# Patient Record
Sex: MALE | Race: WHITE | NOT HISPANIC OR LATINO
[De-identification: names, ages, dates, MRNs, and addresses within clinical notes are randomized per-mention and may not be internally consistent; named-entity substitution may affect disease eponyms.]

---

## 2017-06-27 ENCOUNTER — RX RENEWAL (OUTPATIENT)
Age: 52
End: 2017-06-27

## 2017-10-20 ENCOUNTER — APPOINTMENT (OUTPATIENT)
Dept: OTHER | Facility: CLINIC | Age: 52
End: 2017-10-20
Payer: COMMERCIAL

## 2017-11-02 ENCOUNTER — RESULT CHARGE (OUTPATIENT)
Age: 52
End: 2017-11-02

## 2017-11-03 ENCOUNTER — APPOINTMENT (OUTPATIENT)
Dept: OTHER | Facility: CLINIC | Age: 52
End: 2017-11-03
Payer: COMMERCIAL

## 2017-11-03 ENCOUNTER — LABORATORY RESULT (OUTPATIENT)
Age: 52
End: 2017-11-03

## 2017-11-03 VITALS
OXYGEN SATURATION: 99 % | HEIGHT: 72 IN | WEIGHT: 250 LBS | HEART RATE: 81 BPM | SYSTOLIC BLOOD PRESSURE: 121 MMHG | DIASTOLIC BLOOD PRESSURE: 84 MMHG | BODY MASS INDEX: 33.86 KG/M2

## 2017-11-03 DIAGNOSIS — J37.0 CHRONIC LARYNGITIS: ICD-10-CM

## 2017-11-03 PROCEDURE — 99214 OFFICE O/P EST MOD 30 MIN: CPT | Mod: 25

## 2017-11-03 PROCEDURE — 94010 BREATHING CAPACITY TEST: CPT

## 2017-11-03 PROCEDURE — 96150: CPT

## 2017-11-03 PROCEDURE — 99396 PREV VISIT EST AGE 40-64: CPT

## 2017-11-03 RX ORDER — PREDNISONE 5 MG/1
5 TABLET ORAL
Qty: 100 | Refills: 0 | Status: DISCONTINUED | COMMUNITY
Start: 2017-05-18

## 2017-11-03 RX ORDER — METOPROLOL SUCCINATE 25 MG/1
25 TABLET, EXTENDED RELEASE ORAL
Qty: 30 | Refills: 0 | Status: DISCONTINUED | COMMUNITY
Start: 2017-06-27

## 2017-11-06 LAB
ALBUMIN SERPL ELPH-MCNC: 4.5 G/DL
ALP BLD-CCNC: 50 U/L
ALT SERPL-CCNC: 19 U/L
ANION GAP SERPL CALC-SCNC: 18 MMOL/L
APPEARANCE: CLEAR
AST SERPL-CCNC: 21 U/L
BASOPHILS # BLD AUTO: 0.08 K/UL
BASOPHILS NFR BLD AUTO: 0.7 %
BILIRUB SERPL-MCNC: 0.4 MG/DL
BILIRUBIN URINE: NEGATIVE
BLOOD URINE: NEGATIVE
BUN SERPL-MCNC: 13 MG/DL
CALCIUM SERPL-MCNC: 9.3 MG/DL
CHLORIDE SERPL-SCNC: 104 MMOL/L
CHOLEST SERPL-MCNC: 202 MG/DL
CHOLEST/HDLC SERPL: 4.1 RATIO
CO2 SERPL-SCNC: 20 MMOL/L
COLOR: YELLOW
CREAT SERPL-MCNC: 1.09 MG/DL
EOSINOPHIL # BLD AUTO: 0.31 K/UL
EOSINOPHIL NFR BLD AUTO: 2.6 %
GLUCOSE QUALITATIVE U: NEGATIVE MG/DL
GLUCOSE SERPL-MCNC: 70 MG/DL
HCT VFR BLD CALC: 47.4 %
HDLC SERPL-MCNC: 49 MG/DL
HGB BLD-MCNC: 15 G/DL
IMM GRANULOCYTES NFR BLD AUTO: 1.5 %
KETONES URINE: NEGATIVE
LDLC SERPL CALC-MCNC: 125 MG/DL
LEUKOCYTE ESTERASE URINE: NEGATIVE
LYMPHOCYTES # BLD AUTO: 2.4 K/UL
LYMPHOCYTES NFR BLD AUTO: 20.1 %
MAN DIFF?: NORMAL
MCHC RBC-ENTMCNC: 30.2 PG
MCHC RBC-ENTMCNC: 31.6 GM/DL
MCV RBC AUTO: 95.6 FL
MONOCYTES # BLD AUTO: 1.16 K/UL
MONOCYTES NFR BLD AUTO: 9.7 %
NEUTROPHILS # BLD AUTO: 7.84 K/UL
NEUTROPHILS NFR BLD AUTO: 65.4 %
NITRITE URINE: NEGATIVE
PH URINE: 6.5
PLATELET # BLD AUTO: 255 K/UL
POTASSIUM SERPL-SCNC: 4.4 MMOL/L
PROT SERPL-MCNC: 7.5 G/DL
PROTEIN URINE: NEGATIVE MG/DL
RBC # BLD: 4.96 M/UL
RBC # FLD: 14.7 %
SODIUM SERPL-SCNC: 142 MMOL/L
SPECIFIC GRAVITY URINE: 1.01
TRIGL SERPL-MCNC: 142 MG/DL
UROBILINOGEN URINE: NEGATIVE MG/DL
WBC # FLD AUTO: 11.97 K/UL

## 2018-07-17 ENCOUNTER — RX RENEWAL (OUTPATIENT)
Age: 53
End: 2018-07-17

## 2018-10-23 ENCOUNTER — FORM ENCOUNTER (OUTPATIENT)
Age: 53
End: 2018-10-23

## 2018-10-24 ENCOUNTER — MEDICATION RENEWAL (OUTPATIENT)
Age: 53
End: 2018-10-24

## 2018-11-05 ENCOUNTER — APPOINTMENT (OUTPATIENT)
Dept: OTHER | Facility: CLINIC | Age: 53
End: 2018-11-05

## 2018-11-15 ENCOUNTER — APPOINTMENT (OUTPATIENT)
Dept: OTHER | Facility: CLINIC | Age: 53
End: 2018-11-15

## 2019-01-29 ENCOUNTER — APPOINTMENT (OUTPATIENT)
Dept: OTHER | Facility: CLINIC | Age: 54
End: 2019-01-29
Payer: COMMERCIAL

## 2019-01-29 VITALS
RESPIRATION RATE: 15 BRPM | OXYGEN SATURATION: 98 % | SYSTOLIC BLOOD PRESSURE: 123 MMHG | DIASTOLIC BLOOD PRESSURE: 76 MMHG | HEIGHT: 72 IN | BODY MASS INDEX: 33.86 KG/M2 | WEIGHT: 250 LBS | HEART RATE: 86 BPM

## 2019-01-29 PROCEDURE — 94010 BREATHING CAPACITY TEST: CPT

## 2019-01-29 PROCEDURE — 99396 PREV VISIT EST AGE 40-64: CPT | Mod: 25

## 2019-01-29 PROCEDURE — 99214 OFFICE O/P EST MOD 30 MIN: CPT | Mod: 25

## 2019-01-29 PROCEDURE — 96150: CPT

## 2019-01-29 RX ORDER — LEVALBUTEROL TARTRATE 45 UG/1
45 AEROSOL, METERED ORAL
Qty: 1 | Refills: 1 | Status: COMPLETED | COMMUNITY
Start: 2017-11-03 | End: 2019-01-29

## 2019-01-30 LAB
ALBUMIN SERPL ELPH-MCNC: 4.8 G/DL
ALP BLD-CCNC: 50 U/L
ALT SERPL-CCNC: 22 U/L
ANION GAP SERPL CALC-SCNC: 13 MMOL/L
APPEARANCE: CLEAR
AST SERPL-CCNC: 23 U/L
BACTERIA: NEGATIVE
BASOPHILS # BLD AUTO: 0.01 K/UL
BASOPHILS NFR BLD AUTO: 0.1 %
BILIRUB SERPL-MCNC: 0.2 MG/DL
BILIRUBIN URINE: NEGATIVE
BLOOD URINE: NEGATIVE
BUN SERPL-MCNC: 13 MG/DL
CALCIUM SERPL-MCNC: 9.8 MG/DL
CHLORIDE SERPL-SCNC: 104 MMOL/L
CHOLEST SERPL-MCNC: 193 MG/DL
CHOLEST/HDLC SERPL: 3.5 RATIO
CO2 SERPL-SCNC: 25 MMOL/L
COLOR: YELLOW
CREAT SERPL-MCNC: 0.9 MG/DL
EOSINOPHIL # BLD AUTO: 0.29 K/UL
EOSINOPHIL NFR BLD AUTO: 4.2 %
GLUCOSE QUALITATIVE U: NEGATIVE MG/DL
GLUCOSE SERPL-MCNC: 105 MG/DL
HCT VFR BLD CALC: 41.6 %
HDLC SERPL-MCNC: 55 MG/DL
HGB BLD-MCNC: 13.2 G/DL
IMM GRANULOCYTES NFR BLD AUTO: 0.1 %
KETONES URINE: NEGATIVE
LDLC SERPL CALC-MCNC: 104 MG/DL
LEUKOCYTE ESTERASE URINE: NEGATIVE
LYMPHOCYTES # BLD AUTO: 1.81 K/UL
LYMPHOCYTES NFR BLD AUTO: 26.2 %
MAN DIFF?: NORMAL
MCHC RBC-ENTMCNC: 31.7 GM/DL
MCHC RBC-ENTMCNC: 31.8 PG
MCV RBC AUTO: 100.2 FL
MICROSCOPIC-UA: NORMAL
MONOCYTES # BLD AUTO: 1.05 K/UL
MONOCYTES NFR BLD AUTO: 15.2 %
NEUTROPHILS # BLD AUTO: 3.75 K/UL
NEUTROPHILS NFR BLD AUTO: 54.2 %
NITRITE URINE: NEGATIVE
PH URINE: 7.5
PLATELET # BLD AUTO: 259 K/UL
POTASSIUM SERPL-SCNC: 4.5 MMOL/L
PROT SERPL-MCNC: 7.6 G/DL
PROTEIN URINE: NEGATIVE MG/DL
RBC # BLD: 4.15 M/UL
RBC # FLD: 15.3 %
RED BLOOD CELLS URINE: 2 /HPF
SODIUM SERPL-SCNC: 142 MMOL/L
SPECIFIC GRAVITY URINE: 1.01
SQUAMOUS EPITHELIAL CELLS: 0 /HPF
TRIGL SERPL-MCNC: 170 MG/DL
UROBILINOGEN URINE: NEGATIVE MG/DL
WBC # FLD AUTO: 6.92 K/UL
WHITE BLOOD CELLS URINE: 0 /HPF

## 2019-03-12 ENCOUNTER — FORM ENCOUNTER (OUTPATIENT)
Age: 54
End: 2019-03-12

## 2019-04-24 ENCOUNTER — FORM ENCOUNTER (OUTPATIENT)
Age: 54
End: 2019-04-24

## 2019-05-07 ENCOUNTER — FORM ENCOUNTER (OUTPATIENT)
Age: 54
End: 2019-05-07

## 2019-06-04 ENCOUNTER — FORM ENCOUNTER (OUTPATIENT)
Age: 54
End: 2019-06-04

## 2019-06-11 ENCOUNTER — FORM ENCOUNTER (OUTPATIENT)
Age: 54
End: 2019-06-11

## 2019-06-12 ENCOUNTER — FORM ENCOUNTER (OUTPATIENT)
Age: 54
End: 2019-06-12

## 2019-06-26 ENCOUNTER — RX CHANGE (OUTPATIENT)
Age: 54
End: 2019-06-26

## 2019-08-06 ENCOUNTER — RX RENEWAL (OUTPATIENT)
Age: 54
End: 2019-08-06

## 2019-09-20 ENCOUNTER — RX RENEWAL (OUTPATIENT)
Age: 54
End: 2019-09-20

## 2020-02-11 ENCOUNTER — RESULT CHARGE (OUTPATIENT)
Age: 55
End: 2020-02-11

## 2020-02-12 ENCOUNTER — APPOINTMENT (OUTPATIENT)
Dept: OTHER | Facility: CLINIC | Age: 55
End: 2020-02-12
Payer: COMMERCIAL

## 2020-02-12 VITALS
HEIGHT: 72 IN | RESPIRATION RATE: 16 BRPM | BODY MASS INDEX: 32.51 KG/M2 | SYSTOLIC BLOOD PRESSURE: 126 MMHG | WEIGHT: 240 LBS | HEART RATE: 78 BPM | OXYGEN SATURATION: 97 % | DIASTOLIC BLOOD PRESSURE: 80 MMHG

## 2020-02-12 PROCEDURE — 99396 PREV VISIT EST AGE 40-64: CPT | Mod: 25

## 2020-02-12 PROCEDURE — 94010 BREATHING CAPACITY TEST: CPT

## 2020-02-12 PROCEDURE — 99214 OFFICE O/P EST MOD 30 MIN: CPT | Mod: 25

## 2020-02-13 LAB
APPEARANCE: CLEAR
BACTERIA: NEGATIVE
BILIRUBIN URINE: NEGATIVE
BLOOD URINE: NEGATIVE
COLOR: YELLOW
GLUCOSE QUALITATIVE U: NEGATIVE
HYALINE CASTS: 0 /LPF
KETONES URINE: NEGATIVE
LEUKOCYTE ESTERASE URINE: NEGATIVE
MICROSCOPIC-UA: NORMAL
NITRITE URINE: NEGATIVE
PH URINE: 7
PROTEIN URINE: NORMAL
RED BLOOD CELLS URINE: 1 /HPF
SPECIFIC GRAVITY URINE: 1.02
SQUAMOUS EPITHELIAL CELLS: 0 /HPF
UROBILINOGEN URINE: NORMAL
WHITE BLOOD CELLS URINE: 0 /HPF

## 2020-06-05 ENCOUNTER — APPOINTMENT (OUTPATIENT)
Dept: GASTROENTEROLOGY | Facility: CLINIC | Age: 55
End: 2020-06-05
Payer: COMMERCIAL

## 2020-06-05 VITALS
HEART RATE: 79 BPM | SYSTOLIC BLOOD PRESSURE: 134 MMHG | DIASTOLIC BLOOD PRESSURE: 82 MMHG | TEMPERATURE: 99.3 F | WEIGHT: 250 LBS | HEIGHT: 72 IN | BODY MASS INDEX: 33.86 KG/M2

## 2020-06-05 DIAGNOSIS — R19.4 CHANGE IN BOWEL HABIT: ICD-10-CM

## 2020-06-05 PROCEDURE — 99204 OFFICE O/P NEW MOD 45 MIN: CPT

## 2020-06-05 NOTE — HISTORY OF PRESENT ILLNESS
[FreeTextEntry1] : Mr. Sosa is a pleasant 54M h/o MARY ALICE, CML, GERD, asthma who is seen at the request of Dr. Hung for evaluation for a change in bowel habits.  Reports starting 9/19 he has had significant constipation, will have a hard brown BM every 3-4 days that is hard to pass.  No blood, black stool, weight change. No change in his diet or activity level.  Prior to that, would have a formed brown BM daily.\constance venegas Has tried multiple laxatives which have resulted in loose brown diarrhea.\constance venegas Has R sided, crampy, gassy abdominal pain that is relieved by having a BM.\par Michaelpar Last colonoscopy 7/14 by Dr. Egan, 1 hyperplastic polyp removed at that time.\constance venegas Also c/o frequent heartburn, used to take Nexium however has not taken this for over 1 year.  Has used ranitidine PRN with decent symptom control.  No N/V, dysphagia, odynophagia, black stool, weight loss.  Had an EGD in the distant past which as per his report was normal.\constance venegas Does not smoke, drinks alcohol twice weekly.\constance venegas Sister had a small bowel cancer that was surgically resected, no additional FHx of any GI malignancies.\constance venegas Retired , had significant exposure at the [employer].

## 2020-06-05 NOTE — ASSESSMENT
[FreeTextEntry1] : Will plan on an upper endoscopy for GERD.  Explained risks/benefits/alternatives including not limited to bleeding, infection, perforation, missed lesions, anesthesia complications.  Patient understands and agrees, all questions answered.\par \par Will plan on a colonoscopy for screening, change in bowel habits.  Explained risks/benefits/alternatives including not limited to bleeding, infection, perforation, missed lesions, anesthesia complications.  Patient understands and agrees, all questions answered.  Will use a split dose miralax/gatorade prep with clears the day prior.\par \par Thank you for referring Mr. RUIZ.  Please do not hesitate to call to further discuss his/her care.\par \par Note faxed to requesting MD.\par \par

## 2020-06-14 ENCOUNTER — FORM ENCOUNTER (OUTPATIENT)
Age: 55
End: 2020-06-14

## 2020-07-21 ENCOUNTER — RESULT REVIEW (OUTPATIENT)
Age: 55
End: 2020-07-21

## 2020-07-22 ENCOUNTER — APPOINTMENT (OUTPATIENT)
Dept: GASTROENTEROLOGY | Facility: HOSPITAL | Age: 55
End: 2020-07-22

## 2020-09-02 ENCOUNTER — FORM ENCOUNTER (OUTPATIENT)
Age: 55
End: 2020-09-02

## 2020-12-01 ENCOUNTER — NON-APPOINTMENT (OUTPATIENT)
Age: 55
End: 2020-12-01

## 2021-01-14 ENCOUNTER — APPOINTMENT (OUTPATIENT)
Dept: OTHER | Facility: CLINIC | Age: 56
End: 2021-01-14
Payer: COMMERCIAL

## 2021-01-14 DIAGNOSIS — G47.33 OBSTRUCTIVE SLEEP APNEA (ADULT) (PEDIATRIC): ICD-10-CM

## 2021-01-14 DIAGNOSIS — Z23 ENCOUNTER FOR IMMUNIZATION: ICD-10-CM

## 2021-01-14 DIAGNOSIS — Z99.89 OBSTRUCTIVE SLEEP APNEA (ADULT) (PEDIATRIC): ICD-10-CM

## 2021-01-14 PROCEDURE — 99442: CPT | Mod: 95

## 2021-01-14 PROCEDURE — 99396 PREV VISIT EST AGE 40-64: CPT | Mod: 95

## 2021-03-15 ENCOUNTER — RX RENEWAL (OUTPATIENT)
Age: 56
End: 2021-03-15

## 2021-03-31 ENCOUNTER — APPOINTMENT (OUTPATIENT)
Dept: OTHER | Facility: CLINIC | Age: 56
End: 2021-03-31
Payer: COMMERCIAL

## 2021-03-31 PROCEDURE — 99441: CPT | Mod: 95

## 2021-08-10 ENCOUNTER — FORM ENCOUNTER (OUTPATIENT)
Age: 56
End: 2021-08-10

## 2021-08-17 ENCOUNTER — FORM ENCOUNTER (OUTPATIENT)
Age: 56
End: 2021-08-17

## 2021-09-01 ENCOUNTER — FORM ENCOUNTER (OUTPATIENT)
Age: 56
End: 2021-09-01

## 2021-12-21 ENCOUNTER — FORM ENCOUNTER (OUTPATIENT)
Age: 56
End: 2021-12-21

## 2022-03-24 ENCOUNTER — FORM ENCOUNTER (OUTPATIENT)
Age: 57
End: 2022-03-24

## 2022-04-03 ENCOUNTER — FORM ENCOUNTER (OUTPATIENT)
Age: 57
End: 2022-04-03

## 2022-06-13 ENCOUNTER — APPOINTMENT (OUTPATIENT)
Dept: OTHER | Facility: CLINIC | Age: 57
End: 2022-06-13
Payer: COMMERCIAL

## 2022-06-13 VITALS
WEIGHT: 243 LBS | DIASTOLIC BLOOD PRESSURE: 71 MMHG | HEART RATE: 80 BPM | OXYGEN SATURATION: 98 % | TEMPERATURE: 98.4 F | BODY MASS INDEX: 28.69 KG/M2 | HEIGHT: 77 IN | SYSTOLIC BLOOD PRESSURE: 137 MMHG

## 2022-06-13 PROCEDURE — 99396 PREV VISIT EST AGE 40-64: CPT | Mod: 25

## 2022-06-13 PROCEDURE — 99214 OFFICE O/P EST MOD 30 MIN: CPT | Mod: 25

## 2022-06-13 RX ORDER — ERYTHROMYCIN 5 MG/G
5 OINTMENT OPHTHALMIC
Qty: 4 | Refills: 0 | Status: COMPLETED | COMMUNITY
Start: 2022-04-05

## 2022-06-13 RX ORDER — ALBUTEROL SULFATE 90 UG/1
108 (90 BASE) INHALANT RESPIRATORY (INHALATION)
Qty: 3 | Refills: 1 | Status: COMPLETED | COMMUNITY
Start: 2020-02-12 | End: 2022-06-13

## 2022-06-13 RX ORDER — HYDROMORPHONE HYDROCHLORIDE 2 MG/1
2 TABLET ORAL
Qty: 30 | Refills: 0 | Status: COMPLETED | COMMUNITY
Start: 2022-01-26

## 2022-06-13 RX ORDER — GENTAMICIN SULFATE 1 MG/G
0.1 OINTMENT TOPICAL
Qty: 30 | Refills: 0 | Status: COMPLETED | COMMUNITY
Start: 2022-05-03

## 2022-06-13 RX ORDER — DICLOFENAC SODIUM 75 MG/1
75 TABLET, DELAYED RELEASE ORAL
Qty: 30 | Refills: 0 | Status: COMPLETED | COMMUNITY
Start: 2021-12-29

## 2022-06-13 RX ORDER — MELOXICAM 15 MG/1
15 TABLET ORAL
Qty: 30 | Refills: 0 | Status: COMPLETED | COMMUNITY
Start: 2022-05-19

## 2022-06-14 ENCOUNTER — FORM ENCOUNTER (OUTPATIENT)
Age: 57
End: 2022-06-14

## 2022-06-14 LAB
ALBUMIN SERPL ELPH-MCNC: 4.3 G/DL
ALP BLD-CCNC: 49 U/L
ALT SERPL-CCNC: 15 U/L
ANION GAP SERPL CALC-SCNC: 14 MMOL/L
APPEARANCE: CLEAR
AST SERPL-CCNC: 19 U/L
BACTERIA: NEGATIVE
BASOPHILS # BLD AUTO: 0.03 K/UL
BASOPHILS NFR BLD AUTO: 0.4 %
BILIRUB SERPL-MCNC: 0.3 MG/DL
BILIRUBIN URINE: NEGATIVE
BLOOD URINE: NEGATIVE
BUN SERPL-MCNC: 18 MG/DL
CALCIUM SERPL-MCNC: 9.3 MG/DL
CHLORIDE SERPL-SCNC: 105 MMOL/L
CHOLEST SERPL-MCNC: 180 MG/DL
CO2 SERPL-SCNC: 20 MMOL/L
COLOR: YELLOW
CREAT SERPL-MCNC: 1.12 MG/DL
EGFR: 76 ML/MIN/1.73M2
EOSINOPHIL # BLD AUTO: 0.14 K/UL
EOSINOPHIL NFR BLD AUTO: 1.8 %
GLUCOSE QUALITATIVE U: NEGATIVE
GLUCOSE SERPL-MCNC: 108 MG/DL
HCT VFR BLD CALC: 37.7 %
HDLC SERPL-MCNC: 58 MG/DL
HGB BLD-MCNC: 12.2 G/DL
HYALINE CASTS: 0 /LPF
IMM GRANULOCYTES NFR BLD AUTO: 0.3 %
KETONES URINE: NEGATIVE
LDLC SERPL CALC-MCNC: 88 MG/DL
LEUKOCYTE ESTERASE URINE: NEGATIVE
LYMPHOCYTES # BLD AUTO: 1.54 K/UL
LYMPHOCYTES NFR BLD AUTO: 19.7 %
MAN DIFF?: NORMAL
MCHC RBC-ENTMCNC: 32.4 GM/DL
MCHC RBC-ENTMCNC: 33.1 PG
MCV RBC AUTO: 102.2 FL
MICROSCOPIC-UA: NORMAL
MONOCYTES # BLD AUTO: 0.88 K/UL
MONOCYTES NFR BLD AUTO: 11.3 %
NEUTROPHILS # BLD AUTO: 5.2 K/UL
NEUTROPHILS NFR BLD AUTO: 66.5 %
NITRITE URINE: NEGATIVE
NONHDLC SERPL-MCNC: 121 MG/DL
PH URINE: 6.5
PLATELET # BLD AUTO: 269 K/UL
POTASSIUM SERPL-SCNC: 4.3 MMOL/L
PROT SERPL-MCNC: 6.9 G/DL
PROTEIN URINE: NORMAL
RBC # BLD: 3.69 M/UL
RBC # FLD: 15.7 %
RED BLOOD CELLS URINE: 2 /HPF
SODIUM SERPL-SCNC: 140 MMOL/L
SPECIFIC GRAVITY URINE: 1.02
SQUAMOUS EPITHELIAL CELLS: 0 /HPF
TRIGL SERPL-MCNC: 167 MG/DL
UROBILINOGEN URINE: NORMAL
WBC # FLD AUTO: 7.81 K/UL
WHITE BLOOD CELLS URINE: 0 /HPF

## 2022-07-05 ENCOUNTER — FORM ENCOUNTER (OUTPATIENT)
Age: 57
End: 2022-07-05

## 2023-01-01 ENCOUNTER — FORM ENCOUNTER (OUTPATIENT)
Age: 58
End: 2023-01-01

## 2023-01-08 ENCOUNTER — FORM ENCOUNTER (OUTPATIENT)
Age: 58
End: 2023-01-08

## 2023-02-04 ENCOUNTER — FORM ENCOUNTER (OUTPATIENT)
Age: 58
End: 2023-02-04

## 2023-02-26 ENCOUNTER — FORM ENCOUNTER (OUTPATIENT)
Age: 58
End: 2023-02-26

## 2023-03-22 ENCOUNTER — FORM ENCOUNTER (OUTPATIENT)
Age: 58
End: 2023-03-22

## 2023-04-24 ENCOUNTER — RX RENEWAL (OUTPATIENT)
Age: 58
End: 2023-04-24

## 2023-05-01 ENCOUNTER — FORM ENCOUNTER (OUTPATIENT)
Age: 58
End: 2023-05-01

## 2023-06-01 ENCOUNTER — APPOINTMENT (OUTPATIENT)
Dept: OTHER | Facility: CLINIC | Age: 58
End: 2023-06-01
Payer: COMMERCIAL

## 2023-06-01 VITALS
SYSTOLIC BLOOD PRESSURE: 121 MMHG | WEIGHT: 236 LBS | HEART RATE: 90 BPM | HEIGHT: 72 IN | TEMPERATURE: 98 F | BODY MASS INDEX: 31.97 KG/M2 | DIASTOLIC BLOOD PRESSURE: 83 MMHG | OXYGEN SATURATION: 96 % | RESPIRATION RATE: 16 BRPM

## 2023-06-01 DIAGNOSIS — J32.9 CHRONIC SINUSITIS, UNSPECIFIED: ICD-10-CM

## 2023-06-01 PROCEDURE — 94010 BREATHING CAPACITY TEST: CPT

## 2023-06-01 PROCEDURE — 99396 PREV VISIT EST AGE 40-64: CPT | Mod: 25

## 2023-06-01 PROCEDURE — 99214 OFFICE O/P EST MOD 30 MIN: CPT | Mod: 25

## 2023-06-01 RX ORDER — MOMETASONE FUROATE AND FORMOTEROL FUMARATE DIHYDRATE 200; 5 UG/1; UG/1
200-5 AEROSOL RESPIRATORY (INHALATION)
Qty: 3 | Refills: 2 | Status: ACTIVE | COMMUNITY
Start: 2017-01-17 | End: 1900-01-01

## 2023-06-01 RX ORDER — SODIUM CHLORIDE/SODIUM BICARB
PACKET (EA) NASAL
Qty: 2 | Refills: 0 | Status: COMPLETED | COMMUNITY
Start: 2019-09-20 | End: 2023-06-01

## 2023-06-01 RX ORDER — LEVALBUTEROL HYDROCHLORIDE 0.63 MG/3ML
0.63 SOLUTION RESPIRATORY (INHALATION)
Qty: 2 | Refills: 1 | Status: ACTIVE | COMMUNITY
Start: 2017-11-03 | End: 1900-01-01

## 2023-06-02 LAB
ALBUMIN SERPL ELPH-MCNC: 4.3 G/DL
ALP BLD-CCNC: 49 U/L
ALT SERPL-CCNC: 20 U/L
ANION GAP SERPL CALC-SCNC: 14 MMOL/L
APPEARANCE: CLEAR
AST SERPL-CCNC: 25 U/L
BACTERIA: NEGATIVE /HPF
BILIRUB SERPL-MCNC: 0.4 MG/DL
BILIRUBIN URINE: NEGATIVE
BLOOD URINE: NEGATIVE
BUN SERPL-MCNC: 14 MG/DL
CALCIUM SERPL-MCNC: 9.6 MG/DL
CAST: 0 /LPF
CHLORIDE SERPL-SCNC: 103 MMOL/L
CHOLEST SERPL-MCNC: 203 MG/DL
CO2 SERPL-SCNC: 23 MMOL/L
COLOR: YELLOW
CREAT SERPL-MCNC: 1.05 MG/DL
EGFR: 82 ML/MIN/1.73M2
EPITHELIAL CELLS: 0 /HPF
GLUCOSE QUALITATIVE U: NEGATIVE MG/DL
GLUCOSE SERPL-MCNC: 84 MG/DL
HDLC SERPL-MCNC: 55 MG/DL
KETONES URINE: NEGATIVE MG/DL
LDLC SERPL CALC-MCNC: 127 MG/DL
LEUKOCYTE ESTERASE URINE: NEGATIVE
MICROSCOPIC-UA: NORMAL
NITRITE URINE: NEGATIVE
NONHDLC SERPL-MCNC: 148 MG/DL
PH URINE: 7.5
POTASSIUM SERPL-SCNC: 4.4 MMOL/L
PROT SERPL-MCNC: 7 G/DL
PROTEIN URINE: NEGATIVE MG/DL
RED BLOOD CELLS URINE: 0 /HPF
SODIUM SERPL-SCNC: 141 MMOL/L
SPECIFIC GRAVITY URINE: 1
TRIGL SERPL-MCNC: 107 MG/DL
UROBILINOGEN URINE: 0.2 MG/DL
WHITE BLOOD CELLS URINE: 0 /HPF

## 2023-06-04 ENCOUNTER — FORM ENCOUNTER (OUTPATIENT)
Age: 58
End: 2023-06-04

## 2023-06-13 ENCOUNTER — APPOINTMENT (OUTPATIENT)
Dept: DERMATOLOGY | Facility: CLINIC | Age: 58
End: 2023-06-13

## 2023-06-21 ENCOUNTER — APPOINTMENT (OUTPATIENT)
Dept: DERMATOLOGY | Facility: CLINIC | Age: 58
End: 2023-06-21
Payer: COMMERCIAL

## 2023-06-21 VITALS — WEIGHT: 240 LBS | BODY MASS INDEX: 32.51 KG/M2 | HEIGHT: 72 IN

## 2023-06-21 DIAGNOSIS — L57.0 ACTINIC KERATOSIS: ICD-10-CM

## 2023-06-21 DIAGNOSIS — L90.5 SCAR CONDITIONS AND FIBROSIS OF SKIN: ICD-10-CM

## 2023-06-21 DIAGNOSIS — Z85.828 PERSONAL HISTORY OF OTHER MALIGNANT NEOPLASM OF SKIN: ICD-10-CM

## 2023-06-21 PROCEDURE — 99203 OFFICE O/P NEW LOW 30 MIN: CPT | Mod: 25

## 2023-06-21 PROCEDURE — 17003 DESTRUCT PREMALG LES 2-14: CPT

## 2023-06-21 PROCEDURE — 17000 DESTRUCT PREMALG LESION: CPT

## 2023-06-21 NOTE — PHYSICAL EXAM
[Alert] : alert [Oriented x 3] : ~L oriented x 3 [Full Body Skin Exam Performed] : performed [FreeTextEntry3] : Pt consented to examination of external genitalia and buttocks\par \par Tan skin\par Extensive photodamage\par Gritty erythematous papules on L superior helix x2, L temple x2, R eyebrow x1\par Small erythematous papules scattered\par Symmetric brown macules and papules. No ABCD features. No concerning features on dermoscopy.\par Brown/tan ill defined macules on sun exposed areas\par

## 2023-06-21 NOTE — HISTORY OF PRESENT ILLNESS
[FreeTextEntry1] : Hx of BCC [de-identified] : # Mole/skin check\par Concerns today: Skin tag on neck\par Sunscreen use: Yes \par Hx of blistering sun burns: Yes\par Hx of immunosuppression: Hx of CML\par Personal history of skin cancer: Yes, BCC R forearm\par Family history of skin cancer: BCC in sister and father\par Extensive sun exposure history\par Retired

## 2023-06-21 NOTE — ASSESSMENT
[FreeTextEntry1] : # Actinic keratosis\par - We have discussed the nature and usual course of these lesions including small risk of progression to SCC\par - Cryotherapy administered with 1-2 cycle to actinic keratoses (#5).\par - The procedure was well tolerated, without complication. We have discussed related skin care.\par \par # History of BCC\par # Scar\par - NER\par \par # Skin cancer screening\par - A complete skin exam was performed\par - No concerning lesions identified\par - Photoprotection was discussed including sunscreen and sun protective clothing\par - ABCDEs of melanoma reviewed\par - Call for new or changing lesions\par - CBE q6-12 months\par

## 2024-06-13 ENCOUNTER — APPOINTMENT (OUTPATIENT)
Dept: OTHER | Facility: CLINIC | Age: 59
End: 2024-06-13
Payer: COMMERCIAL

## 2024-06-13 VITALS
OXYGEN SATURATION: 98 % | HEIGHT: 74 IN | TEMPERATURE: 98.3 F | RESPIRATION RATE: 16 BRPM | HEART RATE: 88 BPM | SYSTOLIC BLOOD PRESSURE: 126 MMHG | DIASTOLIC BLOOD PRESSURE: 79 MMHG | WEIGHT: 292 LBS | BODY MASS INDEX: 37.47 KG/M2

## 2024-06-13 DIAGNOSIS — J31.0 CHRONIC RHINITIS: ICD-10-CM

## 2024-06-13 DIAGNOSIS — K21.9 GASTRO-ESOPHAGEAL REFLUX DISEASE W/OUT ESOPHAGITIS: ICD-10-CM

## 2024-06-13 DIAGNOSIS — J45.909 UNSPECIFIED ASTHMA, UNCOMPLICATED: ICD-10-CM

## 2024-06-13 DIAGNOSIS — Z04.9 ENCOUNTER FOR EXAMINATION AND OBSERVATION FOR UNSPECIFIED REASON: ICD-10-CM

## 2024-06-13 DIAGNOSIS — G47.33 OBSTRUCTIVE SLEEP APNEA (ADULT) (PEDIATRIC): ICD-10-CM

## 2024-06-13 DIAGNOSIS — C92.10 CHRONIC MYELOID LEUKEMIA, BCR/ABL-POSITIVE, NOT HAVING ACHIEVED REMISSION: ICD-10-CM

## 2024-06-13 DIAGNOSIS — C44.612 BASAL CELL CARCINOMA OF SKIN OF RIGHT UPPER LIMB, INCLUDING SHOULDER: ICD-10-CM

## 2024-06-13 PROCEDURE — 94010 BREATHING CAPACITY TEST: CPT

## 2024-06-13 PROCEDURE — 99396 PREV VISIT EST AGE 40-64: CPT | Mod: 25

## 2024-06-13 PROCEDURE — 99204 OFFICE O/P NEW MOD 45 MIN: CPT | Mod: 25

## 2024-06-13 RX ORDER — FAMOTIDINE 40 MG/1
40 TABLET, FILM COATED ORAL
Qty: 90 | Refills: 0 | Status: ACTIVE | COMMUNITY
Start: 2020-04-23 | End: 1900-01-01

## 2024-06-13 RX ORDER — OLOPATADINE HYDROCHLORIDE 2 MG/ML
0.2 SOLUTION OPHTHALMIC
Qty: 3 | Refills: 0 | Status: ACTIVE | COMMUNITY
Start: 2023-06-01 | End: 1900-01-01

## 2024-06-13 RX ORDER — LEVALBUTEROL TARTRATE 45 UG/1
45 AEROSOL, METERED ORAL
Qty: 2 | Refills: 1 | Status: ACTIVE | COMMUNITY
Start: 2019-06-26 | End: 1900-01-01

## 2024-06-13 RX ORDER — FEXOFENADINE HYDROCHLORIDE 180 MG/1
180 TABLET ORAL
Qty: 90 | Refills: 1 | Status: ACTIVE | COMMUNITY
Start: 2021-01-14 | End: 1900-01-01

## 2024-06-13 RX ORDER — OMEPRAZOLE 40 MG/1
40 CAPSULE, DELAYED RELEASE ORAL
Qty: 90 | Refills: 1 | Status: ACTIVE | COMMUNITY
Start: 2020-07-24 | End: 1900-01-01

## 2024-06-13 NOTE — REVIEW OF SYSTEMS
[Fever] : no fever [Feeling Poorly] : not feeling poorly [Recent Weight Gain (___ Lbs)] : no recent weight gain [Eyesight Problems] : eyesight problems [Hoarseness] : no hoarseness [Heart Rate Is Slow] : the heart rate was not slow [Heart Rate Is Fast] : the heart rate was not fast [Chest Pain] : no chest pain [Constipation] : constipation [Melena] : no melena [Limb Swelling] : no limb swelling [Dry Skin] : dry skin [Confused] : no confusion [Convulsions] : no convulsions [Fainting] : no fainting [Difficulty Walking] : no difficulty walking [see HPI] : see HPI [Easy Bleeding] : no tendency for easy bleeding [Easy Bruising] : no tendency for easy bruising

## 2024-06-13 NOTE — DISCUSSION/SUMMARY
[Please See Note in Chart and Documentation in Trial DB] : Please see note in chart and documentation in Trial DB. [FreeTextEntry3] : HPI: 59 yo male here for  [facility] monitoring exam Pulm: Dr Catrachito Pineda  PCP: Dr. Brooke at [facility] Exposure Hx: per EAQ dated 3/1/04: present at [facility] on 9/14/01 13 hrs, 9/17, 9/19, 9/21, 9/23, 9/25, 9/27, 9/29 13 hours/day; 14 shifts in Oct 14hrs/day, 35 -14 hour shifts Nov/Dec 2001; 15 12 hour shifts  Occ Hx: retired from [employer]  and his  own [employer] business  PMH/PSH: CML dx on BMB 2018, RADs, sinusitis, GERD, "irregular heart beat"; R biceps tear 1/2021; COVID + 12/2020; treated for pleural effusion 3/2023 preventive are:  colonoscopy 2014 and 2020, EGD 2020 family hx: father and sister have brain aneurysms Allergies: NKDA Meds:see above  Soc Hx:  w 3 kids  Smoking Status: denies  Review of Systems-IAMQ reviewed with patient  PE: see follow up note for details  spirometry : restriction  Results:  CXR: done 3/2023    A/P:  -CXR,CBC, CMP UA and lipids ordered  see [facility] MED note    ...

## 2024-06-13 NOTE — PHYSICAL EXAM
[General Appearance - Alert] : alert [General Appearance - In No Acute Distress] : in no acute distress [General Appearance - Well Nourished] : well nourished [General Appearance - Well Developed] : well developed [General Appearance - Well-Appearing] : healthy appearing [Sclera] : the sclera and conjunctiva were normal [PERRL With Normal Accommodation] : pupils were equal in size, round, and reactive to light [Extraocular Movements] : extraocular movements were intact [Outer Ear] : the ears and nose were normal in appearance [Hearing Threshold Finger Rub Not Haines] : hearing was normal [Examination Of The Oral Cavity] : the lips and gums were normal [Neck Appearance] : the appearance of the neck was normal [Neck Cervical Mass (___cm)] : no neck mass was observed [] : no respiratory distress [Respiration, Rhythm And Depth] : normal respiratory rhythm and effort [Exaggerated Use Of Accessory Muscles For Inspiration] : no accessory muscle use [Auscultation Breath Sounds / Voice Sounds] : lungs were clear to auscultation bilaterally [Heart Rate And Rhythm] : heart rate was normal and rhythm regular [Heart Sounds] : normal S1 and S2 [Edema] : there was no peripheral edema [Bowel Sounds] : normal bowel sounds [Abdomen Soft] : soft [Abdomen Tenderness] : non-tender

## 2024-06-13 NOTE — ASSESSMENT
[FreeTextEntry1] : OAD-- reviewed use of inhalers, asthma Sx mild intermittent   may use albuterol PRN   if Sx frequency increases, resume Dulera, may use as needed as well    rhinitis--renew allegra and flonase   MARY ALICE--pt declined CPAP ;  GERD -- renew omeprazole and famotidine    CML-- continue close follow up w heme/onc, pt will see Dr. Deejay Etienne of [facility]- refer to DERM

## 2024-06-13 NOTE — ASSESSMENT
[FreeTextEntry1] : OAD-- reviewed use of inhalers, asthma Sx mild intermittent   may use albuterol PRN   if Sx frequency increases, resume Dulera, may use as needed as well    rhinitis--renew allegra and flonase   MARY ALICE--pt declined CPAP ;  GERD -- renew omeprazole and famotidine    CML-- continue close follow up w heme/onc, pt will see Dr. Deejay Etienne of [facility]- refer to DERM

## 2024-06-13 NOTE — HISTORY OF PRESENT ILLNESS
[FreeTextEntry1] :  pt here for follow up  [facility] covered asthma, sinusitis , MARY ALICE , GERD , CML   using inhalers , twice a week or less PRN   dulera-   not consistent use as he forgets   shortness of breath in Feb 2023 and had chest x-ray which showed pulm edema (3/31/2023 note from DR. Sylvia Hardy indicates "pleural effusion is not new"), Dr. Villalba stopped Sprycel at that time and was treated with 20 mg lasix for a month  CXR 03 23 2023- small pleural effusion   treated for bronchitis 8/2022 with z pack and prednisone x 10 days and neb 2-3 times/day  skin biopsy done 5/2022 by Dr. Janel Ricci, diagnosed with basal cell carcinoma of the right forearm   GERD-- continues to have reflux and indigestion + occasional difficulty swallowing in  2021 pt seen by ENT and told he has evidence of reflux on ENT exam and was told to take meds;    taking omeprazole daily and occasionally needs famotidine for breakthrough    CML dx by pBMB BCR ABL p210 44.307% quantitative by PCR  [place]  [facility]   [place]s  denies recent ER/urgent care visits for his chest/denies prednisone use   rhinitis--reports continued post nasal drip and  intermittent use of flonase and saline rinse, ; sinus surgery done 2014 ;seen by ENT 1/10/2023 treated with augmentin   MARY ALICE -reporting fatigue ; occasional snoring  when has hay fever and nasal congestion , not using CPAP ;    -constipation s/p colonoscopy 7/2020   hx of skin cancer- last derm exam 03 21 2023

## 2024-06-13 NOTE — DISCUSSION/SUMMARY
[Please See Note in Chart and Documentation in Trial DB] : Please see note in chart and documentation in Trial DB. [FreeTextEntry3] : HPI: 57 yo male here for  [facility] monitoring exam Pulm: Dr Catrachito Pineda  PCP: Dr. 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

## 2024-06-13 NOTE — HISTORY OF PRESENT ILLNESS
[FreeTextEntry1] :  pt here for follow up  [facility] covered asthma, sinusitis , MARY ALICE , GERD , CML   using inhalers , twice a week or less PRN   dulera-   not consistent use as he forgets   shortness of breath in Feb 2023 and had chest x-ray which showed pulm edema (3/31/2023 note from DR. Sylvia Hardy indicates "pleural effusion is not new"), Dr. Villalba stopped Sprycel at that time and was treated with 20 mg lasix for a month  CXR 03 23 2023- small pleural effusion   treated for bronchitis 8/2022 with z pack and prednisone x 10 days and neb 2-3 times/day  skin biopsy done 5/2022 by Dr. Janel Ricci, diagnosed with basal cell carcinoma of the right forearm   GERD-- continues to have reflux and indigestion + occasional difficulty swallowing in  2021 pt seen by ENT and told he has evidence of reflux on ENT exam and was told to take meds;    taking omeprazole daily and occasionally needs famotidine for breakthrough    CML dx by pBMB BCR ABL p210 44.307% quantitative by PCR  [place]  [facility] [place] meds  denies recent ER/urgent care visits for his chest/denies prednisone use   rhinitis--reports continued post nasal drip and  intermittent use of flonase and saline rinse, ; sinus surgery done 2014 ;seen by ENT 1/10/2023 treated with augmentin   MARY ALICE -reporting fatigue ; occasional snoring  when has hay fever and nasal congestion , not using CPAP ;    -constipation s/p colonoscopy 7/2020   hx of skin cancer- last derm exam 03 21 2023

## 2024-06-17 LAB
ALBUMIN SERPL ELPH-MCNC: 4.5 G/DL
ALP BLD-CCNC: 65 U/L
ALT SERPL-CCNC: 18 U/L
ANION GAP SERPL CALC-SCNC: 11 MMOL/L
APPEARANCE: CLEAR
AST SERPL-CCNC: 22 U/L
BACTERIA: NEGATIVE /HPF
BILIRUB SERPL-MCNC: 0.3 MG/DL
BILIRUBIN URINE: NEGATIVE
BLOOD URINE: NEGATIVE
BUN SERPL-MCNC: 15 MG/DL
CALCIUM SERPL-MCNC: 9.3 MG/DL
CAST: 0 /LPF
CHLORIDE SERPL-SCNC: 107 MMOL/L
CHOLEST SERPL-MCNC: 177 MG/DL
CO2 SERPL-SCNC: 24 MMOL/L
COLOR: YELLOW
CREAT SERPL-MCNC: 1.03 MG/DL
EGFR: 83 ML/MIN/1.73M2
EPITHELIAL CELLS: 0 /HPF
GLUCOSE QUALITATIVE U: NEGATIVE MG/DL
GLUCOSE SERPL-MCNC: 86 MG/DL
HCT VFR BLD CALC: 41.2 %
HDLC SERPL-MCNC: 49 MG/DL
HGB BLD-MCNC: 13.6 G/DL
KETONES URINE: NEGATIVE MG/DL
LDLC SERPL CALC-MCNC: 106 MG/DL
LEUKOCYTE ESTERASE URINE: NEGATIVE
MCHC RBC-ENTMCNC: 31.2 PG
MCHC RBC-ENTMCNC: 33 GM/DL
MCV RBC AUTO: 94.5 FL
MICROSCOPIC-UA: NORMAL
NITRITE URINE: NEGATIVE
NONHDLC SERPL-MCNC: 128 MG/DL
PH URINE: 7
PLATELET # BLD AUTO: 264 K/UL
POTASSIUM SERPL-SCNC: 4.6 MMOL/L
PROT SERPL-MCNC: 7.3 G/DL
PROTEIN URINE: NEGATIVE MG/DL
RBC # BLD: 4.36 M/UL
RBC # FLD: 14.8 %
RED BLOOD CELLS URINE: 0 /HPF
SODIUM SERPL-SCNC: 142 MMOL/L
SPECIFIC GRAVITY URINE: 1.01
TRIGL SERPL-MCNC: 121 MG/DL
UROBILINOGEN URINE: 1 MG/DL
WBC # FLD AUTO: 10.1 K/UL
WHITE BLOOD CELLS URINE: 0 /HPF

## 2024-11-22 ENCOUNTER — RX RENEWAL (OUTPATIENT)
Age: 59
End: 2024-11-22

## 2025-06-12 ENCOUNTER — APPOINTMENT (OUTPATIENT)
Dept: OTHER | Facility: CLINIC | Age: 60
End: 2025-06-12
Payer: COMMERCIAL

## 2025-06-12 VITALS
OXYGEN SATURATION: 95 % | SYSTOLIC BLOOD PRESSURE: 108 MMHG | TEMPERATURE: 99.1 F | HEART RATE: 84 BPM | BODY MASS INDEX: 31.44 KG/M2 | WEIGHT: 245 LBS | HEIGHT: 74 IN | DIASTOLIC BLOOD PRESSURE: 75 MMHG

## 2025-06-12 PROCEDURE — 99396 PREV VISIT EST AGE 40-64: CPT | Mod: 25

## 2025-06-12 PROCEDURE — 99214 OFFICE O/P EST MOD 30 MIN: CPT | Mod: 25

## 2025-09-12 ENCOUNTER — NON-APPOINTMENT (OUTPATIENT)
Age: 60
End: 2025-09-12